# Patient Record
Sex: MALE | Race: WHITE | NOT HISPANIC OR LATINO | Employment: FULL TIME | URBAN - METROPOLITAN AREA
[De-identification: names, ages, dates, MRNs, and addresses within clinical notes are randomized per-mention and may not be internally consistent; named-entity substitution may affect disease eponyms.]

---

## 2021-06-16 PROBLEM — R11.10 ABDOMINAL PAIN, VOMITING, AND DIARRHEA: Status: ACTIVE | Noted: 2017-06-11

## 2021-06-16 PROBLEM — E86.0 DEHYDRATION: Status: ACTIVE | Noted: 2017-06-11

## 2021-06-16 PROBLEM — D72.829 LEUKOCYTOSIS: Status: ACTIVE | Noted: 2017-06-11

## 2021-06-16 PROBLEM — F41.9 ANXIETY: Status: ACTIVE | Noted: 2017-06-11

## 2021-06-16 PROBLEM — R10.9 ABDOMINAL PAIN, VOMITING, AND DIARRHEA: Status: ACTIVE | Noted: 2017-06-11

## 2021-06-16 PROBLEM — N17.9 ACUTE RENAL FAILURE (H): Status: ACTIVE | Noted: 2017-06-11

## 2021-06-16 PROBLEM — R19.7 ABDOMINAL PAIN, VOMITING, AND DIARRHEA: Status: ACTIVE | Noted: 2017-06-11

## 2022-03-06 ENCOUNTER — APPOINTMENT (OUTPATIENT)
Dept: RADIOLOGY | Facility: HOSPITAL | Age: 37
End: 2022-03-06
Attending: STUDENT IN AN ORGANIZED HEALTH CARE EDUCATION/TRAINING PROGRAM
Payer: COMMERCIAL

## 2022-03-06 ENCOUNTER — HOSPITAL ENCOUNTER (EMERGENCY)
Facility: HOSPITAL | Age: 37
Discharge: HOME | End: 2022-03-06
Attending: STUDENT IN AN ORGANIZED HEALTH CARE EDUCATION/TRAINING PROGRAM | Admitting: STUDENT IN AN ORGANIZED HEALTH CARE EDUCATION/TRAINING PROGRAM
Payer: COMMERCIAL

## 2022-03-06 VITALS
OXYGEN SATURATION: 96 % | TEMPERATURE: 98.4 F | HEART RATE: 128 BPM | RESPIRATION RATE: 12 BRPM | HEIGHT: 69 IN | DIASTOLIC BLOOD PRESSURE: 82 MMHG | BODY MASS INDEX: 26.66 KG/M2 | SYSTOLIC BLOOD PRESSURE: 158 MMHG | WEIGHT: 180 LBS

## 2022-03-06 DIAGNOSIS — M25.462 EFFUSION OF LEFT KNEE: ICD-10-CM

## 2022-03-06 DIAGNOSIS — S86.912A STRAIN OF LEFT KNEE, INITIAL ENCOUNTER: ICD-10-CM

## 2022-03-06 PROCEDURE — 250N000013 HC RX MED GY IP 250 OP 250 PS 637: Performed by: STUDENT IN AN ORGANIZED HEALTH CARE EDUCATION/TRAINING PROGRAM

## 2022-03-06 PROCEDURE — 99284 EMERGENCY DEPT VISIT MOD MDM: CPT | Mod: 25

## 2022-03-06 PROCEDURE — 73562 X-RAY EXAM OF KNEE 3: CPT | Mod: LT

## 2022-03-06 RX ORDER — IBUPROFEN 600 MG/1
600 TABLET, FILM COATED ORAL ONCE
Status: COMPLETED | OUTPATIENT
Start: 2022-03-06 | End: 2022-03-06

## 2022-03-06 RX ADMIN — IBUPROFEN 600 MG: 600 TABLET ORAL at 13:05

## 2022-03-06 ASSESSMENT — ENCOUNTER SYMPTOMS: ARTHRALGIAS: 1

## 2022-03-06 NOTE — Clinical Note
Kenton Lorenzo was seen and treated in our emergency department on 3/6/2022.  He may return to work on 03/14/2022.       If you have any questions or concerns, please don't hesitate to call.      Jevon Dale MD

## 2022-03-06 NOTE — ED PROVIDER NOTES
"EMERGENCY DEPARTMENT ENCOUNTER      NAME: Kenton Lorenzo  AGE: 36 year old male  YOB: 1985  MRN: [id number]  EVALUATION DATE & TIME: 3/6/2022 11:14 AM    PCP: No primary care provider on file.    ED PROVIDER: Jevon Dale M.D.      Chief Complaint   Patient presents with     Knee Injury         FINAL IMPRESSION:  1. Strain of left knee, initial encounter    2. Effusion of left knee          ED COURSE & MEDICAL DECISION MAKING:    Pertinent Labs & Imaging studies reviewed. (See chart for details)  36 year old male presents to the Emergency Department for evaluation of knee pain.  X-ray is normal.  I suspect meniscal tear or ligament injury.  Will discharge with knee immobilizer crutches and follow-up with [facility].    11:32 AM I met with patient for initial interview and encounter. PPE worn includes N95 mask.  12:48 PM I updated patient with imaging results and plan for discharge. Patient agreeable.    At the conclusion of the encounter I discussed the results of all of the tests and the disposition. The questions were answered. The patient or family acknowledged understanding and was agreeable with the care plan.         MEDICATIONS GIVEN IN THE EMERGENCY:  Medications   ibuprofen (ADVIL/MOTRIN) tablet 600 mg (600 mg Oral Given 3/6/22 1305)       NEW PRESCRIPTIONS STARTED AT TODAY'S ER VISIT  Discharge Medication List as of 3/6/2022  1:18 PM             =================================================================    HPI    Patient information was obtained from: Patient     Use of : N/A        Kenton Lorenzo is a 36 year old male with no pertinent history who presents to this ED via walk-in for evaluation of knee pain.    Patient reports he sustained an injury to his left knee last night when he jumped off of his couch and did a \"tuck and roll\" onto the ground. He states that he felt a pop in the knee and now feels like it needs to be popped back in. He endorses swelling and " "limited extension of the knee; presents to the ED on crutches. Patient denies any other complaints.      REVIEW OF SYSTEMS   Review of Systems   Musculoskeletal: Positive for arthralgias (left knee).   All other systems reviewed and are negative.       PAST MEDICAL HISTORY:  History reviewed. No pertinent past medical history.    PAST SURGICAL HISTORY:  History reviewed. No pertinent surgical history.        CURRENT MEDICATIONS:    benzonatate (TESSALON PERLES) 100 MG capsule        ALLERGIES:  No Known Allergies    FAMILY HISTORY:  Family History   Problem Relation Age of Onset     Asthma Mother      Asthma Father        SOCIAL HISTORY:   Social History     Socioeconomic History     Marital status: Single     Spouse name: Not on file     Number of children: Not on file     Years of education: Not on file     Highest education level: Not on file   Occupational History     Not on file   Tobacco Use     Smoking status: Current Every Day Smoker     Packs/day: 0.50     Years: 11.00     Pack years: 5.50     Types: Cigarettes     Smokeless tobacco: Not on file   Substance and Sexual Activity     Alcohol use: No     Drug use: Yes     Frequency: 3.0 times per week     Types: Marijuana     Sexual activity: Yes   Other Topics Concern     Not on file   Social History Narrative     Not on file     Social Determinants of Health     Financial Resource Strain: Not on file   Food Insecurity: Not on file   Transportation Needs: Not on file   Physical Activity: Not on file   Stress: Not on file   Social Connections: Not on file   Intimate Partner Violence: Not on file   Housing Stability: Not on file       VITALS:  BP (!) 158/82   Pulse (!) 128   Temp 98.4  F (36.9  C)   Resp 12   Ht 1.753 m (5' 9\")   Wt 81.6 kg (180 lb)   SpO2 96%   BMI 26.58 kg/m        PHYSICAL EXAM    VITAL SIGNS: BP (!) 158/82   Pulse (!) 128   Temp 98.4  F (36.9  C)   Resp 12   Ht 1.753 m (5' 9\")   Wt 81.6 kg (180 lb)   SpO2 96%   BMI 26.58 kg/m  "   Constitutional:  Well developed, well nourished  EYES: Conjunctivae clear, no discharge  HENT: Atraumatic, normocephalic, bilateral external ears normal.  Oropharynx moist. Nose normal.   Neck: Normal ROM , Supple   Respiratory:  No respiratory distress, normal nonlabored respirations.   Cardiovascular:  Distal perfusion appears intact  Musculoskeletal:  Swelling to left knee with moderately sized effusion, No pain with palpation, ROM intact, No edema appreciated, No cyanosis, No clubbing. Good range of motion in all major joints.   Integument:  Warm, Dry, No erythema, No rash.   Neurologic:  Alert and oriented. No focal deficits noted.  Ambulatory  Psychiatric:  Affect normal         LAB:  All pertinent labs reviewed and interpreted.  Labs Ordered and Resulted from Time of ED Arrival to Time of ED Departure - No data to display    RADIOLOGY:  Reviewed all pertinent imaging. Please see official radiology report.  XR Knee Left 3 Views   Final Result   IMPRESSION: Joint effusion. No radiographic evidence of an acute fracture. Joint spaces are maintained.          I, Marcellus Concepcion, am serving as a scribe to document services personally performed by Dr. Jevon Dale based on my observation and the provider's statements to me. IJevon MD attest that Marcellus Concepcion is acting in a scribe capacity, has observed my performance of the services and has documented them in accordance with my direction.    Jevon Dale M.D.  Emergency Medicine  [facility] EMERGENCY DEPARTMENT  [facility] [facility] [address]  [phone]  Dept: [phone]     Jevon Dale MD  03/06/22 1097

## 2022-07-28 ENCOUNTER — LAB REQUISITION (OUTPATIENT)
Dept: LAB | Facility: CLINIC | Age: 37
End: 2022-07-28

## 2022-07-28 PROCEDURE — 86706 HEP B SURFACE ANTIBODY: CPT | Performed by: INTERNAL MEDICINE

## 2022-07-28 PROCEDURE — 86765 RUBEOLA ANTIBODY: CPT | Performed by: INTERNAL MEDICINE

## 2022-07-28 PROCEDURE — 86735 MUMPS ANTIBODY: CPT | Performed by: INTERNAL MEDICINE

## 2022-07-28 PROCEDURE — 86787 VARICELLA-ZOSTER ANTIBODY: CPT | Performed by: INTERNAL MEDICINE

## 2022-07-28 PROCEDURE — 86481 TB AG RESPONSE T-CELL SUSP: CPT | Performed by: INTERNAL MEDICINE

## 2022-07-28 PROCEDURE — 86762 RUBELLA ANTIBODY: CPT | Performed by: INTERNAL MEDICINE

## 2022-07-29 LAB
HBV SURFACE AB SERPL IA-ACNC: 0 M[IU]/ML
MEV IGG SER IA-ACNC: 119 AU/ML
MEV IGG SER IA-ACNC: POSITIVE
MUMPS ANTIBODY IGG INSTRUMENT VALUE: 108 AU/ML
MUV IGG SER QL IA: POSITIVE
RUBV IGG SERPL QL IA: 6.93 INDEX
RUBV IGG SERPL QL IA: POSITIVE
VZV IGG SER QL IA: 2450 INDEX
VZV IGG SER QL IA: POSITIVE

## 2022-07-30 LAB
GAMMA INTERFERON BACKGROUND BLD IA-ACNC: 0.01 IU/ML
M TB IFN-G BLD-IMP: NEGATIVE
M TB IFN-G CD4+ BCKGRND COR BLD-ACNC: 9.99 IU/ML
MITOGEN IGNF BCKGRD COR BLD-ACNC: 0 IU/ML
MITOGEN IGNF BCKGRD COR BLD-ACNC: 0.01 IU/ML
QUANTIFERON MITOGEN: 10 IU/ML
QUANTIFERON NIL TUBE: 0.01 IU/ML
QUANTIFERON TB1 TUBE: 0.02 IU/ML
QUANTIFERON TB2 TUBE: 0.01

## 2023-05-10 ENCOUNTER — OFFICE VISIT (OUTPATIENT)
Dept: FAMILY MEDICINE | Facility: CLINIC | Age: 38
End: 2023-05-10
Payer: COMMERCIAL

## 2023-05-10 VITALS
OXYGEN SATURATION: 97 % | SYSTOLIC BLOOD PRESSURE: 152 MMHG | BODY MASS INDEX: 23.33 KG/M2 | RESPIRATION RATE: 16 BRPM | WEIGHT: 158 LBS | DIASTOLIC BLOOD PRESSURE: 90 MMHG | HEART RATE: 84 BPM | TEMPERATURE: 99 F

## 2023-05-10 DIAGNOSIS — H69.91 DYSFUNCTION OF RIGHT EUSTACHIAN TUBE: Primary | ICD-10-CM

## 2023-05-10 PROCEDURE — 99213 OFFICE O/P EST LOW 20 MIN: CPT | Performed by: PHYSICIAN ASSISTANT

## 2023-05-10 RX ORDER — AMOXICILLIN 875 MG
875 TABLET ORAL 2 TIMES DAILY
Qty: 20 TABLET | Refills: 0 | Status: SHIPPED | OUTPATIENT
Start: 2023-05-10 | End: 2023-05-20

## 2023-05-10 RX ORDER — CETIRIZINE HYDROCHLORIDE 10 MG/1
10 TABLET ORAL DAILY
Qty: 20 TABLET | Refills: 0 | Status: SHIPPED | OUTPATIENT
Start: 2023-05-10

## 2023-05-10 RX ORDER — FLUTICASONE PROPIONATE 50 MCG
1 SPRAY, SUSPENSION (ML) NASAL DAILY
Qty: 9.9 ML | Refills: 0 | Status: SHIPPED | OUTPATIENT
Start: 2023-05-10

## 2023-05-10 NOTE — PROGRESS NOTES
Patient presents with:  Ear Problem: Ear plugged right and right face pressure x 1 week       Clinical Decision Making:  Right ear plugged sensation.  Suspect eustachian tube dysfunction.  No signs of cerumen impaction, otitis externa, or otitis media.  Recommend treatment for ETD with Flonase and Zyrtec.  Patient was also given prescription for amoxicillin in the case that ear pain worsens or if no improvement over the course of 5 days.      ICD-10-CM    1. Dysfunction of right eustachian tube  H69.81 fluticasone (FLONASE) 50 MCG/ACT nasal spray     cetirizine (ZYRTEC) 10 MG tablet     amoxicillin (AMOXIL) 875 MG tablet          Patient Instructions   1. Begin taking 1 tablet of Zyrtec daily.  2. Begin using Flonase nasal spray once per day.  3. Hold off on taking amoxicillin.  If you develop facial or ear pain go ahead and start taking the medicine.  If no improvement after 5 days of Flonase and Zyrtec go ahead and start taking amoxicillin.  Take amoxicillin with food.      HPI:  Kenton Lorenzo is a 37 year old male who presents today complaining of right ear plugged and face pressure for the past 1 week.  Feels like there is water in the ear, but patient has not been swimming.  He denies any pain.  He is a little bit nasally congested, but nothing severe.  He does not know if he has had any history of seasonal allergies.  His energy is normal and he has not had any fevers.    History obtained from the patient.    Problem List:  2017-06: Acute renal failure (H)  2017-06: Abdominal pain, vomiting, and diarrhea  2017-06: Dehydration  2017-06: Leukocytosis  2017-06: Anxiety      No past medical history on file.    Social History     Tobacco Use     Smoking status: Every Day     Packs/day: 0.50     Years: 11.00     Pack years: 5.50     Types: Cigarettes     Smokeless tobacco: Not on file   Vaping Use     Vaping status: Not on file   Substance Use Topics     Alcohol use: No       Review of Systems    Vitals:    05/10/23  1654   BP: (!) 152/90   BP Location: Right arm   Patient Position: Sitting   Cuff Size: Adult Regular   Pulse: 84   Resp: 16   Temp: 99  F (37.2  C)   TempSrc: Tympanic   SpO2: 97%   Weight: 71.7 kg (158 lb)       Physical Exam  Vitals and nursing note reviewed.   Constitutional:       General: He is not in acute distress.     Appearance: He is not toxic-appearing or diaphoretic.   HENT:      Head: Normocephalic and atraumatic.      Right Ear: Tympanic membrane, ear canal and external ear normal.      Left Ear: Tympanic membrane, ear canal and external ear normal.      Ears:      Comments: Single streak of redness present on the right tympanic membrane.  No findings consistent with middle ear effusion     Nose: Congestion present.   Eyes:      Conjunctiva/sclera: Conjunctivae normal.   Pulmonary:      Effort: Pulmonary effort is normal. No respiratory distress.   Neurological:      Mental Status: He is alert.   Psychiatric:         Mood and Affect: Mood normal.         Behavior: Behavior normal.         Thought Content: Thought content normal.         Judgment: Judgment normal.       At the end of the encounter, I discussed results, diagnosis, medications. Discussed red flags for immediate return to clinic/ER, as well as indications for follow up if no improvement. Patient understood and agreed to plan. Patient was stable for discharge.

## 2023-05-10 NOTE — PATIENT INSTRUCTIONS
Begin taking 1 tablet of Zyrtec daily.  Begin using Flonase nasal spray once per day.  Hold off on taking amoxicillin.  If you develop facial or ear pain go ahead and start taking the medicine.  If no improvement after 5 days of Flonase and Zyrtec go ahead and start taking amoxicillin.  Take amoxicillin with food.

## 2024-04-29 ENCOUNTER — APPOINTMENT (OUTPATIENT)
Dept: CT IMAGING | Facility: HOSPITAL | Age: 39
End: 2024-04-29
Attending: STUDENT IN AN ORGANIZED HEALTH CARE EDUCATION/TRAINING PROGRAM
Payer: COMMERCIAL

## 2024-04-29 ENCOUNTER — HOSPITAL ENCOUNTER (EMERGENCY)
Facility: HOSPITAL | Age: 39
Discharge: HOME | End: 2024-04-29
Attending: STUDENT IN AN ORGANIZED HEALTH CARE EDUCATION/TRAINING PROGRAM | Admitting: STUDENT IN AN ORGANIZED HEALTH CARE EDUCATION/TRAINING PROGRAM
Payer: COMMERCIAL

## 2024-04-29 VITALS
OXYGEN SATURATION: 98 % | SYSTOLIC BLOOD PRESSURE: 115 MMHG | RESPIRATION RATE: 26 BRPM | TEMPERATURE: 98.4 F | WEIGHT: 170 LBS | HEART RATE: 59 BPM | HEIGHT: 69 IN | BODY MASS INDEX: 25.18 KG/M2 | DIASTOLIC BLOOD PRESSURE: 73 MMHG

## 2024-04-29 DIAGNOSIS — J43.9 MILD EMPHYSEMA (H): ICD-10-CM

## 2024-04-29 DIAGNOSIS — J21.9 BRONCHIOLITIS: ICD-10-CM

## 2024-04-29 DIAGNOSIS — R04.2 HEMOPTYSIS: ICD-10-CM

## 2024-04-29 LAB
ANION GAP SERPL CALCULATED.3IONS-SCNC: 11 MMOL/L (ref 7–15)
BASOPHILS # BLD AUTO: 0.1 10E3/UL (ref 0–0.2)
BASOPHILS NFR BLD AUTO: 1 %
BUN SERPL-MCNC: 8.6 MG/DL (ref 6–20)
CALCIUM SERPL-MCNC: 9.5 MG/DL (ref 8.6–10)
CHLORIDE SERPL-SCNC: 102 MMOL/L (ref 98–107)
CREAT SERPL-MCNC: 0.85 MG/DL (ref 0.67–1.17)
DEPRECATED HCO3 PLAS-SCNC: 23 MMOL/L (ref 22–29)
EGFRCR SERPLBLD CKD-EPI 2021: >90 ML/MIN/1.73M2
EOSINOPHIL # BLD AUTO: 0.6 10E3/UL (ref 0–0.7)
EOSINOPHIL NFR BLD AUTO: 7 %
ERYTHROCYTE [DISTWIDTH] IN BLOOD BY AUTOMATED COUNT: 14 % (ref 10–15)
GLUCOSE SERPL-MCNC: 98 MG/DL (ref 70–99)
HCT VFR BLD AUTO: 48.8 % (ref 40–53)
HGB BLD-MCNC: 16.5 G/DL (ref 13.3–17.7)
IMM GRANULOCYTES # BLD: 0 10E3/UL
IMM GRANULOCYTES NFR BLD: 0 %
LYMPHOCYTES # BLD AUTO: 1.9 10E3/UL (ref 0.8–5.3)
LYMPHOCYTES NFR BLD AUTO: 22 %
MCH RBC QN AUTO: 29.5 PG (ref 26.5–33)
MCHC RBC AUTO-ENTMCNC: 33.8 G/DL (ref 31.5–36.5)
MCV RBC AUTO: 87 FL (ref 78–100)
MONOCYTES # BLD AUTO: 0.8 10E3/UL (ref 0–1.3)
MONOCYTES NFR BLD AUTO: 9 %
NEUTROPHILS # BLD AUTO: 5.3 10E3/UL (ref 1.6–8.3)
NEUTROPHILS NFR BLD AUTO: 61 %
NRBC # BLD AUTO: 0 10E3/UL
NRBC BLD AUTO-RTO: 0 /100
PLATELET # BLD AUTO: 330 10E3/UL (ref 150–450)
POTASSIUM SERPL-SCNC: 4.3 MMOL/L (ref 3.4–5.3)
RBC # BLD AUTO: 5.6 10E6/UL (ref 4.4–5.9)
SODIUM SERPL-SCNC: 136 MMOL/L (ref 135–145)
TROPONIN T SERPL HS-MCNC: <6 NG/L
WBC # BLD AUTO: 8.6 10E3/UL (ref 4–11)

## 2024-04-29 PROCEDURE — 250N000011 HC RX IP 250 OP 636: Performed by: STUDENT IN AN ORGANIZED HEALTH CARE EDUCATION/TRAINING PROGRAM

## 2024-04-29 PROCEDURE — 36415 COLL VENOUS BLD VENIPUNCTURE: CPT | Performed by: STUDENT IN AN ORGANIZED HEALTH CARE EDUCATION/TRAINING PROGRAM

## 2024-04-29 PROCEDURE — 84295 ASSAY OF SERUM SODIUM: CPT | Performed by: STUDENT IN AN ORGANIZED HEALTH CARE EDUCATION/TRAINING PROGRAM

## 2024-04-29 PROCEDURE — 71275 CT ANGIOGRAPHY CHEST: CPT

## 2024-04-29 PROCEDURE — 85025 COMPLETE CBC W/AUTO DIFF WBC: CPT | Performed by: STUDENT IN AN ORGANIZED HEALTH CARE EDUCATION/TRAINING PROGRAM

## 2024-04-29 PROCEDURE — 99285 EMERGENCY DEPT VISIT HI MDM: CPT | Mod: 25

## 2024-04-29 PROCEDURE — 84484 ASSAY OF TROPONIN QUANT: CPT | Performed by: STUDENT IN AN ORGANIZED HEALTH CARE EDUCATION/TRAINING PROGRAM

## 2024-04-29 PROCEDURE — 93005 ELECTROCARDIOGRAM TRACING: CPT | Performed by: STUDENT IN AN ORGANIZED HEALTH CARE EDUCATION/TRAINING PROGRAM

## 2024-04-29 RX ORDER — IOPAMIDOL 755 MG/ML
90 INJECTION, SOLUTION INTRAVASCULAR ONCE
Status: COMPLETED | OUTPATIENT
Start: 2024-04-29 | End: 2024-04-29

## 2024-04-29 RX ADMIN — IOPAMIDOL 90 ML: 755 INJECTION, SOLUTION INTRAVENOUS at 09:29

## 2024-04-29 ASSESSMENT — ACTIVITIES OF DAILY LIVING (ADL)
ADLS_ACUITY_SCORE: 33
ADLS_ACUITY_SCORE: 35
ADLS_ACUITY_SCORE: 35

## 2024-04-29 ASSESSMENT — COLUMBIA-SUICIDE SEVERITY RATING SCALE - C-SSRS
1. IN THE PAST MONTH, HAVE YOU WISHED YOU WERE DEAD OR WISHED YOU COULD GO TO SLEEP AND NOT WAKE UP?: NO
6. HAVE YOU EVER DONE ANYTHING, STARTED TO DO ANYTHING, OR PREPARED TO DO ANYTHING TO END YOUR LIFE?: NO
2. HAVE YOU ACTUALLY HAD ANY THOUGHTS OF KILLING YOURSELF IN THE PAST MONTH?: NO

## 2024-04-29 NOTE — DISCHARGE INSTRUCTIONS
Overall, your ER workup looks good, it showed some bronchiolitis which is usually due to a viral infection and could be contributing to the coughing up blood.    Your CT scan did show some mild emphysema which will eventually turn into COPD with time if you continue to smoke.  Keep that in mind and continue to work on trying to smoke less until the point you are ready to quit completely.

## 2024-04-29 NOTE — ED PROVIDER NOTES
EMERGENCY DEPARTMENT ENCOUNTER       ED Course & Medical Decision Making     Final Impression  38 year old male presents for evaluation of hemoptysis.  Patient reports that he has had some intermittent chest pain for the last 6-8 months, also some though has not thought much of it.  Patient has a extensive smoking history, smoked for many years, about 3-4 packs/day.  Patient states that he usually gargles with water in the shower every morning to help clear his throat, was doing that this morning and felt some water go down the wrong tube, then began coughing profusely, ended up coughing up small amount of blood.  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    Physical exam fairly reassuring, heart lungs clear to auscultation bilaterally.  EKG and troponin negative.  CT chest PE run negative for PE or pneumonia, but does show some early emphysema changes that were discussed with patient, counseled patient on smoking cessation and encouraged him to decrease eventually stop smoking as it would likely worsen his lung function and would likely culminate in COPD if he does not stop smoking.  All findings discussed with patient, will discharge home.    Prior to making a final disposition on this patient the results of patient's tests and other diagnostic studies were discussed with the patient. All questions were answered. Patient expressed understanding of the plan and was amenable to it.    Medical Decision Making    History:  Supplemental history from: NA  External Record(s) reviewed as documented below;  [facility], seen for left hand discomfort/pain, tendinopathy    Work Up:  Chart documentation includes differential considered and any EKGs or imaging independently interpreted by provider, where specified.  DDx considered but not limited to:: Embolism, pneumonia, lung cancer, COPD, asthma  Considered administering antibiotics for:  "Possible pneumonia, though CT showed more of a bronchiolitis picture, no focal consolidations, likely viral thus will not prescribe antibiotics    Complicating factors:  Care impacted by chronic illness: Smoking / Nicotine Use  Care affected by social determinants of health: N/A    Disposition considerations: Discharge. No recommendations on prescription strength medication(s). See documentation for any additional details.      Medications   iopamidol (ISOVUE-370) solution 90 mL (90 mLs Intravenous $Given 4/29/24 7448)       New Prescriptions    No medications on file       Final Impression     1. Hemoptysis    2. Bronchiolitis    3. Mild emphysema (H)        Chief Complaint     Chief Complaint   Patient presents with    Hemoptysis     Patient states he has been having heartburn 6 mos, today while taking shower he swallowed water and quickly started coughing, and coughed up bright red blood, denies ETOH. No blood thinner    HPI     Kenton Lorenzo is a 38 year old male who presents for evaluation of hemoptysis.    Physical Exam     /73   Pulse 59   Temp 98.4  F (36.9  C)   Resp 26   Ht 1.753 m (5' 9\")   Wt 77.1 kg (170 lb)   SpO2 98%   BMI 25.10 kg/m    Constitutional: Awake, alert, in no acute distress.  Head: Normocephalic, atraumatic.  ENT: Mucous membranes moist.  Eyes: Conjunctiva normal.  Respiratory: Respirations even, unlabored, in no acute respiratory distress.  Lungs clear to auscultation bilaterally, no coarseness or wheezing.  Cardiovascular: Regular rate and rhythm. Good peripheral perfusion.  No appreciable rubs or murmurs.  GI: Abdomen soft, non-tender.  Musculoskeletal: Moves all 4 extremities equally.  Integument: Warm, dry.  Neurologic: Alert & oriented x 3. Normal speech. Grossly normal motor and sensory function. No focal deficits noted.  Psychiatric: Normal mood    Labs & Imaging     Imaging reviewed and independently interpreted as below;   CTA chest PE run evaluated, no PE, no " pneumonia, some mild emphysema developing    Results for orders placed or performed during the hospital encounter of 04/29/24   CT Chest Pulmonary Embolism w Contrast    Impression    IMPRESSION:  1.  No pulmonary artery embolism.  2.  Mild to moderate bronchiolitis with bronchial wall thickening and pulmonary air trapping. No pneumonic infiltrate or pleural effusion.  3.  Mild emphysema.  4.  Scattered 3 mm right upper lobe solid pulmonary nodules which are likely postinfectious or postinflammatory. Recommend follow-up per the guidelines below. No pulmonary mass.      REFERENCE:  Guidelines for Management of Incidental Pulmonary Nodules Detected on CT Images: From the Fleischner Society 2017.   Guidelines apply to incidental nodules in patients who are 35 years or older.  Guidelines do not apply to lung cancer screening, patients with immunosuppression, or patients with known primary cancer.    MULTIPLE NODULES  Nodule size less than or equal to 6 mm  Low-risk patients: No follow-up needed.  High-risk patients: Optional follow-up at 12 months.           Troponin T, High Sensitivity (now)   Result Value Ref Range    Troponin T, High Sensitivity <6 <=22 ng/L   Basic metabolic panel   Result Value Ref Range    Sodium 136 135 - 145 mmol/L    Potassium 4.3 3.4 - 5.3 mmol/L    Chloride 102 98 - 107 mmol/L    Carbon Dioxide (CO2) 23 22 - 29 mmol/L    Anion Gap 11 7 - 15 mmol/L    Urea Nitrogen 8.6 6.0 - 20.0 mg/dL    Creatinine 0.85 0.67 - 1.17 mg/dL    GFR Estimate >90 >60 mL/min/1.73m2    Calcium 9.5 8.6 - 10.0 mg/dL    Glucose 98 70 - 99 mg/dL   CBC with platelets and differential   Result Value Ref Range    WBC Count 8.6 4.0 - 11.0 10e3/uL    RBC Count 5.60 4.40 - 5.90 10e6/uL    Hemoglobin 16.5 13.3 - 17.7 g/dL    Hematocrit 48.8 40.0 - 53.0 %    MCV 87 78 - 100 fL    MCH 29.5 26.5 - 33.0 pg    MCHC 33.8 31.5 - 36.5 g/dL    RDW 14.0 10.0 - 15.0 %    Platelet Count 330 150 - 450 10e3/uL    % Neutrophils 61 %    %  Lymphocytes 22 %    % Monocytes 9 %    % Eosinophils 7 %    % Basophils 1 %    % Immature Granulocytes 0 %    NRBCs per 100 WBC 0 <1 /100    Absolute Neutrophils 5.3 1.6 - 8.3 10e3/uL    Absolute Lymphocytes 1.9 0.8 - 5.3 10e3/uL    Absolute Monocytes 0.8 0.0 - 1.3 10e3/uL    Absolute Eosinophils 0.6 0.0 - 0.7 10e3/uL    Absolute Basophils 0.1 0.0 - 0.2 10e3/uL    Absolute Immature Granulocytes 0.0 <=0.4 10e3/uL    Absolute NRBCs 0.0 10e3/uL       EKG     EKG reviewed and independently interpreted as below;  Sinus rhythm with sinus arrhythmia, rate 67.  .  .  QTc 422.  No STEMI.     Jah Wong MD  04/29/24 1046

## 2024-04-29 NOTE — ED TRIAGE NOTES
Patient states he has been having heartburn 6 mos, today while taking shower he swallowed water and quickly started coughing, and coughed up bright red blood, denies ETOH. No blood thinner

## 2024-05-01 LAB
ATRIAL RATE - MUSE: 67 BPM
DIASTOLIC BLOOD PRESSURE - MUSE: 86 MMHG
INTERPRETATION ECG - MUSE: NORMAL
P AXIS - MUSE: 62 DEGREES
PR INTERVAL - MUSE: 164 MS
QRS DURATION - MUSE: 100 MS
QT - MUSE: 400 MS
QTC - MUSE: 422 MS
R AXIS - MUSE: 67 DEGREES
SYSTOLIC BLOOD PRESSURE - MUSE: 144 MMHG
T AXIS - MUSE: 65 DEGREES
VENTRICULAR RATE- MUSE: 67 BPM

## 2025-04-28 ENCOUNTER — APPOINTMENT (OUTPATIENT)
Dept: CT IMAGING | Facility: HOSPITAL | Age: 40
End: 2025-04-28
Attending: EMERGENCY MEDICINE
Payer: COMMERCIAL

## 2025-04-28 ENCOUNTER — HOSPITAL ENCOUNTER (EMERGENCY)
Facility: HOSPITAL | Age: 40
Discharge: HOME | End: 2025-04-28
Attending: EMERGENCY MEDICINE | Admitting: EMERGENCY MEDICINE
Payer: COMMERCIAL

## 2025-04-28 VITALS
TEMPERATURE: 97.8 F | HEIGHT: 66 IN | RESPIRATION RATE: 20 BRPM | DIASTOLIC BLOOD PRESSURE: 86 MMHG | WEIGHT: 170 LBS | BODY MASS INDEX: 27.32 KG/M2 | HEART RATE: 88 BPM | SYSTOLIC BLOOD PRESSURE: 134 MMHG | OXYGEN SATURATION: 95 %

## 2025-04-28 DIAGNOSIS — R11.2 NAUSEA AND VOMITING, UNSPECIFIED VOMITING TYPE: ICD-10-CM

## 2025-04-28 DIAGNOSIS — R55 SYNCOPE AND COLLAPSE: ICD-10-CM

## 2025-04-28 LAB
ANION GAP SERPL CALCULATED.3IONS-SCNC: 19 MMOL/L (ref 7–15)
BASE EXCESS BLDV CALC-SCNC: -3.4 MMOL/L (ref -3–3)
BUN SERPL-MCNC: 10.6 MG/DL (ref 6–20)
CALCIUM SERPL-MCNC: 9.7 MG/DL (ref 8.8–10.4)
CHLORIDE SERPL-SCNC: 102 MMOL/L (ref 98–107)
CREAT SERPL-MCNC: 0.89 MG/DL (ref 0.67–1.17)
EGFRCR SERPLBLD CKD-EPI 2021: >90 ML/MIN/1.73M2
ERYTHROCYTE [DISTWIDTH] IN BLOOD BY AUTOMATED COUNT: 14.1 % (ref 10–15)
ETHANOL SERPL-MCNC: <0.01 G/DL
GLUCOSE SERPL-MCNC: 115 MG/DL (ref 70–99)
HCO3 BLDV-SCNC: 19 MMOL/L (ref 21–28)
HCO3 SERPL-SCNC: 17 MMOL/L (ref 22–29)
HCT VFR BLD AUTO: 49.7 % (ref 40–53)
HGB BLD-MCNC: 16.9 G/DL (ref 13.3–17.7)
LACTATE SERPL-SCNC: 1.1 MMOL/L (ref 0.7–2)
LACTATE SERPL-SCNC: 4.7 MMOL/L (ref 0.7–2)
MCH RBC QN AUTO: 29.4 PG (ref 26.5–33)
MCHC RBC AUTO-ENTMCNC: 34 G/DL (ref 31.5–36.5)
MCV RBC AUTO: 86 FL (ref 78–100)
O2/TOTAL GAS SETTING VFR VENT: 21 %
OXYHGB MFR BLDV: 88 % (ref 70–75)
PCO2 BLDV: 26 MM HG (ref 40–50)
PH BLDV: 7.46 [PH] (ref 7.32–7.43)
PLATELET # BLD AUTO: 362 10E3/UL (ref 150–450)
PO2 BLDV: 52 MM HG (ref 25–47)
POTASSIUM SERPL-SCNC: 4.4 MMOL/L (ref 3.4–5.3)
RBC # BLD AUTO: 5.75 10E6/UL (ref 4.4–5.9)
SAO2 % BLDV: 91.6 % (ref 70–75)
SODIUM SERPL-SCNC: 138 MMOL/L (ref 135–145)
TROPONIN T SERPL HS-MCNC: <6 NG/L
WBC # BLD AUTO: 11.4 10E3/UL (ref 4–11)

## 2025-04-28 PROCEDURE — 96361 HYDRATE IV INFUSION ADD-ON: CPT

## 2025-04-28 PROCEDURE — 82077 ASSAY SPEC XCP UR&BREATH IA: CPT | Performed by: EMERGENCY MEDICINE

## 2025-04-28 PROCEDURE — 93005 ELECTROCARDIOGRAM TRACING: CPT | Performed by: EMERGENCY MEDICINE

## 2025-04-28 PROCEDURE — 70450 CT HEAD/BRAIN W/O DYE: CPT

## 2025-04-28 PROCEDURE — 85018 HEMOGLOBIN: CPT | Performed by: EMERGENCY MEDICINE

## 2025-04-28 PROCEDURE — 36415 COLL VENOUS BLD VENIPUNCTURE: CPT | Performed by: EMERGENCY MEDICINE

## 2025-04-28 PROCEDURE — 96375 TX/PRO/DX INJ NEW DRUG ADDON: CPT

## 2025-04-28 PROCEDURE — 84484 ASSAY OF TROPONIN QUANT: CPT | Performed by: EMERGENCY MEDICINE

## 2025-04-28 PROCEDURE — 96374 THER/PROPH/DIAG INJ IV PUSH: CPT

## 2025-04-28 PROCEDURE — 80048 BASIC METABOLIC PNL TOTAL CA: CPT | Performed by: EMERGENCY MEDICINE

## 2025-04-28 PROCEDURE — 250N000011 HC RX IP 250 OP 636: Performed by: EMERGENCY MEDICINE

## 2025-04-28 PROCEDURE — 258N000003 HC RX IP 258 OP 636: Performed by: EMERGENCY MEDICINE

## 2025-04-28 PROCEDURE — 99285 EMERGENCY DEPT VISIT HI MDM: CPT | Mod: 25

## 2025-04-28 PROCEDURE — 83605 ASSAY OF LACTIC ACID: CPT | Performed by: EMERGENCY MEDICINE

## 2025-04-28 PROCEDURE — 82805 BLOOD GASES W/O2 SATURATION: CPT | Performed by: EMERGENCY MEDICINE

## 2025-04-28 RX ORDER — DROPERIDOL 2.5 MG/ML
1.25 INJECTION, SOLUTION INTRAMUSCULAR; INTRAVENOUS ONCE
Status: COMPLETED | OUTPATIENT
Start: 2025-04-28 | End: 2025-04-28

## 2025-04-28 RX ORDER — ONDANSETRON 2 MG/ML
4 INJECTION INTRAMUSCULAR; INTRAVENOUS ONCE
Status: COMPLETED | OUTPATIENT
Start: 2025-04-28 | End: 2025-04-28

## 2025-04-28 RX ADMIN — DROPERIDOL 1.25 MG: 2.5 INJECTION, SOLUTION INTRAMUSCULAR; INTRAVENOUS at 19:42

## 2025-04-28 RX ADMIN — SODIUM CHLORIDE 1000 ML: 9 INJECTION, SOLUTION INTRAVENOUS at 19:17

## 2025-04-28 RX ADMIN — ONDANSETRON 4 MG: 2 INJECTION INTRAMUSCULAR; INTRAVENOUS at 19:17

## 2025-04-28 ASSESSMENT — ACTIVITIES OF DAILY LIVING (ADL)
ADLS_ACUITY_SCORE: 41

## 2025-04-28 NOTE — ED NOTES
Bed: JNED-19  Expected date: 4/28/25  Expected time: 6:49 PM  Means of arrival: Ambulance  Comments:  Nile 38 yo M syncope, fall, hit head

## 2025-04-28 NOTE — Clinical Note
Kenton Lorenzo was seen and treated in our emergency department on 4/28/2025.  He may return to work on 04/30/2025.       If you have any questions or concerns, please don't hesitate to call.      Anant Scott MD

## 2025-04-29 NOTE — ED TRIAGE NOTES
Pt arrives via EMS from [facility]. Last thing pt remembers was he was standing in the kitchen. Pt woke up on the tile floor, abrasion to R side of head. No thinners. When EMS arrived pt's girlfriend was in the kitchen with him, his head in her lap. Pt was disoriented, diaphoretic. Denies thinners, not a diabetic, no hx of  seizures. Pt actively vomiting during triage, feels better after vomiting. BS for EMS was 113

## 2025-04-29 NOTE — DISCHARGE INSTRUCTIONS
It is unclear if you had a fainting spell earlier today, or if you hit your head and had some confusion afterwards.  There is also a small chance that there was a seizure that nobody witnessed.    Your lab work looks great, your brain CT scan is normal.    Had a precaution I placed a referral to neurology for follow-up.  Otherwise stable be important for you to set up care with a primary care doctor and somebody will call to help set up that appointment as well.  Plenty fluids over the next couple of days to stay hydrated.

## 2025-04-29 NOTE — ED PROVIDER NOTES
EMERGENCY DEPARTMENT ENCOUNTER      NAME: Kenton Lorenzo  AGE: 39 year old male  YOB: 1985  MRN: [id number]  EVALUATION DATE & TIME: 4/28/2025  6:51 PM    PCP: Ellen Kirk    ED PROVIDER: Anant Scott M.D.      Chief Complaint   Patient presents with    Syncope         FINAL IMPRESSION:  1. Syncope and collapse    2. Nausea and vomiting, unspecified vomiting type          ED COURSE & MEDICAL DECISION MAKING:    Pertinent Labs & Imaging studies reviewed below.  All EKGs below represent my independent interpretation.   ED Course as of 04/28/25 2251 Mon Apr 28, 2025 1910 Patient is a 39-year-old gentleman who presents with syncope, confusion.  He fell without warning onto a tile floor, he has poor recollection of events.  He was disoriented, sweaty when medics arrived.  Here he is actively retching, glucose of 113.  No obvious sign of trauma to the head or neck, he denies headache, but is still mildly confused.  I need a CT scan to rule out traumatic tracheal injury, otherwise screening lab work, IV fluids and analgesia ordered.  EKG ordered.   1913 EKG shows sinus rhythm with a rate of 76.  No acute ischemic ST or T wave morphology.  Normal axis, normal intervals.  Compared to prior EKG on April 29, 2024, there is no significant change.  Impression: Sinus rhythm without evidence of arrhythmia or ischemia   1930 I spoke to lab: lactic acid on blood gas was 4.7.  He is currently afebrile, was feeling well prior to arrival, he is not hypotensive, and septic shock due to bacterial infection is incredibly unlikely at this time, but it does raise concern for possible seizure   1952 Troponin T, High Sensitivity: <6   2059 Head CT w/o contrast  1.  No acute intracranial process.   2109 I called poison control: Pt was exposed to OneGuard concentrate, Contac mouse block, and D-fense powder.  There were no components to the any of these chemicals or explain patient's symptoms.  Rash, irritation of  the skin or lungs would be most likely.  Patient has no systemic rash and no cough or shortness of breath or wheezing.   2129 Lactic Acid: 1.1   Patient felt much better was observed here for a total of 3 hours and had no persistent confusion.  Unclear if he had a syncopal episode earlier this afternoon, or if this was due to seizure.  I placed a referral for neurology, but did not feel the need to place him on seizure precautions based on his unconfirmed event.  I did inform him that primary care establishment was a priority and I placed referral for this.        Additional ED Course timestamps entered by medical scribe:  7:05 PM I met the patient and performed my initial interview and exam.  7:29 PM I spoke with critical labs about lactic acid of 4.7  9:09 PM I spoke with poison control.     Medical Decision Making  I obtained history from EMS  Discharge. No recommendations on prescription strength medication(s). I considered admission, but discharged patient after significant clinical improvement.    MIPS (CTPE, Dental pain, Law, Sinusitis, Asthma/COPD, Head Trauma): Adult Minor Head Trauma:Loss of consciousness with short term memory deficits and Loss of consciousness with a headache    SEPSIS: None      MEDICATIONS GIVEN IN THE EMERGENCY:  Medications   ondansetron (ZOFRAN) injection 4 mg (4 mg Intravenous $Given 4/28/25 1917)   sodium chloride 0.9% BOLUS 1,000 mL (0 mLs Intravenous Stopped 4/28/25 2036)   droPERidol (INAPSINE) injection 1.25 mg (1.25 mg Intravenous $Given 4/28/25 1942)         NEW PRESCRIPTIONS STARTED AT TODAY'S ER VISIT  Discharge Medication List as of 4/28/2025  9:37 PM             =================================================================    HPI    Kenton Lorenzo is a 39 year old male who presents to this ED for evaluation of syncope.    Per the patient:  He was at work today having a relatively normal day. 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 Then at home he had a syncopal episode he didn't remember and woke with a headache, nausea, and diaphoresis. EMS was called and he was brought to the ER.    Currently, he notes the headache going away, but has mild chest pain. He is also still nauseous with some vomiting now, too. He denies abdominal pain.      VITALS:  /86   Pulse 88   Temp 97.8  F (36.6  C)   Resp 20   Ht 1.676 m (5' 6\")   Wt 77.1 kg (170 lb)   SpO2 95%   BMI 27.44 kg/m      PHYSICAL EXAM    Constitutional: Uncomfortable appearing. Diaphoretic. Retching into an emesis bag.  HENT: Atraumatic, mucous membranes moist, nose normal. Neck- Supple, gross ROM intact.   Eyes: Pupils mid-range and reactive to light, conjunctiva without injection, no discharge.   Respiratory: Clear to auscultation bilaterally, no respiratory distress, no wheezing, speaks full sentences easily. No cough.  Cardiovascular: Normal heart rate, regular rhythm, no murmurs.   GI: Soft, no tenderness to deep palpation in all quadrants, no masses.  Musculoskeletal: Moving all 4 extremities intentionally and without pain. No obvious deformity.  Skin: diaphoretic  Neurologic: Alert & oriented x 3, cranial nerves grossly intact.  Psychiatric: Affect normal, cooperative.      I, Ivan Durand am serving as a scribe to document services personally performed by Dr. Anant Scott based on my observation and the provider's statements to me. I, Anant Scott MD attest that Ivan Durand is acting in a scribe capacity, has observed my performance of the services and has documented them in accordance with my direction.    Anant Scott M.D.  Emergency Medicine  [facility] EMERGENCY DEPARTMENT  [address] [facility] [address] [phone]  Dept: [phone]       Anant Scott MD  04/28/25 2254    "

## 2025-05-01 ENCOUNTER — TELEPHONE (OUTPATIENT)
Dept: NEUROLOGY | Facility: CLINIC | Age: 40
End: 2025-05-01

## 2025-05-01 NOTE — TELEPHONE ENCOUNTER
Was your seizure or loss of consciousness related to substance abuse or alcohol use?   No - Is this your first seizure? No - Have you tried at least 2 medications to treat your seizures?  No - Schedule with general neurologist    Spoke with pt and pt'jet mota and offered soonest available New Seizure with general neurology, 6/20/25 with Dr. Riddle at [place]. Pt's svetlana states they will go elsewhere outside our system to be seen sooner and call back if necessary.